# Patient Record
Sex: MALE | Race: WHITE | NOT HISPANIC OR LATINO
[De-identification: names, ages, dates, MRNs, and addresses within clinical notes are randomized per-mention and may not be internally consistent; named-entity substitution may affect disease eponyms.]

---

## 2017-04-07 ENCOUNTER — TRANSCRIPTION ENCOUNTER (OUTPATIENT)
Age: 47
End: 2017-04-07

## 2017-05-13 ENCOUNTER — TRANSCRIPTION ENCOUNTER (OUTPATIENT)
Age: 47
End: 2017-05-13

## 2017-06-01 ENCOUNTER — TRANSCRIPTION ENCOUNTER (OUTPATIENT)
Age: 47
End: 2017-06-01

## 2017-06-06 ENCOUNTER — TRANSCRIPTION ENCOUNTER (OUTPATIENT)
Age: 47
End: 2017-06-06

## 2018-01-06 ENCOUNTER — TRANSCRIPTION ENCOUNTER (OUTPATIENT)
Age: 48
End: 2018-01-06

## 2018-11-14 ENCOUNTER — TRANSCRIPTION ENCOUNTER (OUTPATIENT)
Age: 48
End: 2018-11-14

## 2019-03-06 ENCOUNTER — TRANSCRIPTION ENCOUNTER (OUTPATIENT)
Age: 49
End: 2019-03-06

## 2020-06-11 ENCOUNTER — INPATIENT (INPATIENT)
Facility: HOSPITAL | Age: 50
LOS: 0 days | Discharge: HOME | DRG: 343 | End: 2020-06-12
Attending: SURGERY | Admitting: SURGERY
Payer: COMMERCIAL

## 2020-06-11 ENCOUNTER — TRANSCRIPTION ENCOUNTER (OUTPATIENT)
Age: 50
End: 2020-06-11

## 2020-06-11 ENCOUNTER — RESULT REVIEW (OUTPATIENT)
Age: 50
End: 2020-06-11

## 2020-06-11 VITALS
DIASTOLIC BLOOD PRESSURE: 100 MMHG | OXYGEN SATURATION: 99 % | SYSTOLIC BLOOD PRESSURE: 149 MMHG | HEART RATE: 103 BPM | TEMPERATURE: 100 F | RESPIRATION RATE: 20 BRPM

## 2020-06-11 DIAGNOSIS — K37 UNSPECIFIED APPENDICITIS: ICD-10-CM

## 2020-06-11 DIAGNOSIS — Z98.890 OTHER SPECIFIED POSTPROCEDURAL STATES: Chronic | ICD-10-CM

## 2020-06-11 LAB
ALBUMIN SERPL ELPH-MCNC: 4 G/DL — SIGNIFICANT CHANGE UP (ref 3.3–5)
ALP SERPL-CCNC: 52 U/L — SIGNIFICANT CHANGE UP (ref 40–120)
ALT FLD-CCNC: 39 U/L — SIGNIFICANT CHANGE UP (ref 12–78)
ANION GAP SERPL CALC-SCNC: 8 MMOL/L — SIGNIFICANT CHANGE UP (ref 5–17)
APTT BLD: 25.3 SEC — LOW (ref 27.5–36.3)
AST SERPL-CCNC: 20 U/L — SIGNIFICANT CHANGE UP (ref 15–37)
BASOPHILS # BLD AUTO: 0.03 K/UL — SIGNIFICANT CHANGE UP (ref 0–0.2)
BASOPHILS NFR BLD AUTO: 0.2 % — SIGNIFICANT CHANGE UP (ref 0–2)
BILIRUB SERPL-MCNC: 1.4 MG/DL — HIGH (ref 0.2–1.2)
BUN SERPL-MCNC: 14 MG/DL — SIGNIFICANT CHANGE UP (ref 7–23)
CALCIUM SERPL-MCNC: 9.4 MG/DL — SIGNIFICANT CHANGE UP (ref 8.5–10.1)
CHLORIDE SERPL-SCNC: 102 MMOL/L — SIGNIFICANT CHANGE UP (ref 96–108)
CO2 SERPL-SCNC: 26 MMOL/L — SIGNIFICANT CHANGE UP (ref 22–31)
CREAT SERPL-MCNC: 0.97 MG/DL — SIGNIFICANT CHANGE UP (ref 0.5–1.3)
EOSINOPHIL # BLD AUTO: 0.08 K/UL — SIGNIFICANT CHANGE UP (ref 0–0.5)
EOSINOPHIL NFR BLD AUTO: 0.5 % — SIGNIFICANT CHANGE UP (ref 0–6)
GLUCOSE SERPL-MCNC: 149 MG/DL — HIGH (ref 70–99)
HCT VFR BLD CALC: 45.4 % — SIGNIFICANT CHANGE UP (ref 39–50)
HGB BLD-MCNC: 14.6 G/DL — SIGNIFICANT CHANGE UP (ref 13–17)
IMM GRANULOCYTES NFR BLD AUTO: 0.4 % — SIGNIFICANT CHANGE UP (ref 0–1.5)
INR BLD: 1.2 RATIO — HIGH (ref 0.88–1.16)
LACTATE SERPL-SCNC: 1.5 MMOL/L — SIGNIFICANT CHANGE UP (ref 0.7–2)
LIDOCAIN IGE QN: 74 U/L — SIGNIFICANT CHANGE UP (ref 73–393)
LYMPHOCYTES # BLD AUTO: 15.9 % — SIGNIFICANT CHANGE UP (ref 13–44)
LYMPHOCYTES # BLD AUTO: 2.37 K/UL — SIGNIFICANT CHANGE UP (ref 1–3.3)
MCHC RBC-ENTMCNC: 25.3 PG — LOW (ref 27–34)
MCHC RBC-ENTMCNC: 32.2 GM/DL — SIGNIFICANT CHANGE UP (ref 32–36)
MCV RBC AUTO: 78.7 FL — LOW (ref 80–100)
MONOCYTES # BLD AUTO: 1.27 K/UL — HIGH (ref 0–0.9)
MONOCYTES NFR BLD AUTO: 8.5 % — SIGNIFICANT CHANGE UP (ref 2–14)
NEUTROPHILS # BLD AUTO: 11.12 K/UL — HIGH (ref 1.8–7.4)
NEUTROPHILS NFR BLD AUTO: 74.5 % — SIGNIFICANT CHANGE UP (ref 43–77)
PLATELET # BLD AUTO: 248 K/UL — SIGNIFICANT CHANGE UP (ref 150–400)
POTASSIUM SERPL-MCNC: 3.5 MMOL/L — SIGNIFICANT CHANGE UP (ref 3.5–5.3)
POTASSIUM SERPL-SCNC: 3.5 MMOL/L — SIGNIFICANT CHANGE UP (ref 3.5–5.3)
PROT SERPL-MCNC: 8 GM/DL — SIGNIFICANT CHANGE UP (ref 6–8.3)
PROTHROM AB SERPL-ACNC: 13.4 SEC — HIGH (ref 10–12.9)
RBC # BLD: 5.77 M/UL — SIGNIFICANT CHANGE UP (ref 4.2–5.8)
RBC # FLD: 13.6 % — SIGNIFICANT CHANGE UP (ref 10.3–14.5)
SARS-COV-2 RNA SPEC QL NAA+PROBE: SIGNIFICANT CHANGE UP
SODIUM SERPL-SCNC: 136 MMOL/L — SIGNIFICANT CHANGE UP (ref 135–145)
WBC # BLD: 14.93 K/UL — HIGH (ref 3.8–10.5)
WBC # FLD AUTO: 14.93 K/UL — HIGH (ref 3.8–10.5)

## 2020-06-11 PROCEDURE — 44970 LAPAROSCOPY APPENDECTOMY: CPT

## 2020-06-11 PROCEDURE — 74177 CT ABD & PELVIS W/CONTRAST: CPT | Mod: 26

## 2020-06-11 PROCEDURE — C1889: CPT

## 2020-06-11 PROCEDURE — 99223 1ST HOSP IP/OBS HIGH 75: CPT | Mod: 57

## 2020-06-11 PROCEDURE — 88304 TISSUE EXAM BY PATHOLOGIST: CPT

## 2020-06-11 RX ORDER — ONDANSETRON 8 MG/1
4 TABLET, FILM COATED ORAL ONCE
Refills: 0 | Status: COMPLETED | OUTPATIENT
Start: 2020-06-11 | End: 2020-06-11

## 2020-06-11 RX ORDER — SODIUM CHLORIDE 9 MG/ML
1000 INJECTION INTRAMUSCULAR; INTRAVENOUS; SUBCUTANEOUS ONCE
Refills: 0 | Status: COMPLETED | OUTPATIENT
Start: 2020-06-11 | End: 2020-06-11

## 2020-06-11 RX ORDER — MORPHINE SULFATE 50 MG/1
2 CAPSULE, EXTENDED RELEASE ORAL EVERY 4 HOURS
Refills: 0 | Status: DISCONTINUED | OUTPATIENT
Start: 2020-06-11 | End: 2020-06-12

## 2020-06-11 RX ORDER — ONDANSETRON 8 MG/1
4 TABLET, FILM COATED ORAL EVERY 8 HOURS
Refills: 0 | Status: DISCONTINUED | OUTPATIENT
Start: 2020-06-11 | End: 2020-06-11

## 2020-06-11 RX ORDER — MORPHINE SULFATE 50 MG/1
4 CAPSULE, EXTENDED RELEASE ORAL ONCE
Refills: 0 | Status: DISCONTINUED | OUTPATIENT
Start: 2020-06-11 | End: 2020-06-11

## 2020-06-11 RX ORDER — CIPROFLOXACIN LACTATE 400MG/40ML
400 VIAL (ML) INTRAVENOUS ONCE
Refills: 0 | Status: COMPLETED | OUTPATIENT
Start: 2020-06-11 | End: 2020-06-11

## 2020-06-11 RX ORDER — KETOROLAC TROMETHAMINE 30 MG/ML
15 SYRINGE (ML) INJECTION EVERY 6 HOURS
Refills: 0 | Status: COMPLETED | OUTPATIENT
Start: 2020-06-11 | End: 2020-06-12

## 2020-06-11 RX ORDER — ACETAMINOPHEN 500 MG
1000 TABLET ORAL EVERY 6 HOURS
Refills: 0 | Status: DISCONTINUED | OUTPATIENT
Start: 2020-06-11 | End: 2020-06-12

## 2020-06-11 RX ORDER — SODIUM CHLORIDE 9 MG/ML
1000 INJECTION, SOLUTION INTRAVENOUS ONCE
Refills: 0 | Status: COMPLETED | OUTPATIENT
Start: 2020-06-11 | End: 2020-06-11

## 2020-06-11 RX ORDER — METRONIDAZOLE 500 MG
500 TABLET ORAL EVERY 8 HOURS
Refills: 0 | Status: DISCONTINUED | OUTPATIENT
Start: 2020-06-11 | End: 2020-06-11

## 2020-06-11 RX ORDER — SODIUM CHLORIDE 9 MG/ML
1000 INJECTION, SOLUTION INTRAVENOUS
Refills: 0 | Status: DISCONTINUED | OUTPATIENT
Start: 2020-06-11 | End: 2020-06-12

## 2020-06-11 RX ORDER — HEPARIN SODIUM 5000 [USP'U]/ML
5000 INJECTION INTRAVENOUS; SUBCUTANEOUS EVERY 8 HOURS
Refills: 0 | Status: DISCONTINUED | OUTPATIENT
Start: 2020-06-11 | End: 2020-06-11

## 2020-06-11 RX ORDER — METRONIDAZOLE 500 MG
500 TABLET ORAL ONCE
Refills: 0 | Status: COMPLETED | OUTPATIENT
Start: 2020-06-11 | End: 2020-06-11

## 2020-06-11 RX ORDER — LORATADINE 10 MG/1
1 TABLET ORAL
Qty: 0 | Refills: 0 | DISCHARGE

## 2020-06-11 RX ORDER — CIPROFLOXACIN LACTATE 400MG/40ML
200 VIAL (ML) INTRAVENOUS EVERY 12 HOURS
Refills: 0 | Status: CANCELLED | OUTPATIENT
Start: 2020-06-12 | End: 2020-06-11

## 2020-06-11 RX ORDER — PANTOPRAZOLE SODIUM 20 MG/1
40 TABLET, DELAYED RELEASE ORAL DAILY
Refills: 0 | Status: DISCONTINUED | OUTPATIENT
Start: 2020-06-11 | End: 2020-06-11

## 2020-06-11 RX ADMIN — Medication 200 MILLIGRAM(S): at 22:40

## 2020-06-11 RX ADMIN — SODIUM CHLORIDE 1000 MILLILITER(S): 9 INJECTION INTRAMUSCULAR; INTRAVENOUS; SUBCUTANEOUS at 20:28

## 2020-06-11 RX ADMIN — ONDANSETRON 4 MILLIGRAM(S): 8 TABLET, FILM COATED ORAL at 20:28

## 2020-06-11 RX ADMIN — Medication 100 MILLIGRAM(S): at 21:47

## 2020-06-11 RX ADMIN — MORPHINE SULFATE 4 MILLIGRAM(S): 50 CAPSULE, EXTENDED RELEASE ORAL at 20:30

## 2020-06-11 NOTE — ED ADULT NURSE NOTE - OBJECTIVE STATEMENT
Pt presents to the ED for evaluation of RLQ abdominal pain which began last evening. Pt states he's been having nausea, some diarrhea and severe pains. Denies previous medical hx or abdominal surgeries. States he only ate a little bit of custard in the past 24h and is unable to keep any other type of PO intake down. Denies fevers/sick contacts.

## 2020-06-11 NOTE — H&P ADULT - NSHPPHYSICALEXAM_GEN_ALL_CORE
General Appearance: Appears well, NAD  Neck: Supple  Chest: Equal expansion bilaterally, equal breath sounds  CV: Pulse regular presently  Abdomen: Soft, non-distended, tender to palpation in the umbilical area and RLQ, some guarding  Extremities: Grossly symmetric General Appearance: Appears well, NAD  Neck: Supple  Chest: Equal expansion bilaterally, equal breath sounds  CV: Pulse regular presently  Abdomen: Soft, non-distended, tender to palpation in the umbilical area and RLQ, some guarding  Extremities: Grossly symmetric    Attending exam:  Pt is AAOx3  Pt in no acute distress  Neuro: CNII-XII grossly intact   Psych: normal affect  HEENT: Normocephalic, atraumatic, BAILEY, EOM wnl  Neck: No crepitus, no ecchymosis, no hematoma, to exam, no JVD, no tracheal deviation  Cardiovascular: S1S2 Present  Respiratory: Respiratory Effort normal; no wheezes, rales or rhonchi to exam, CTAB  ABD: bowel sounds (+), soft, + right lower quadrant tenderness to exam, non distended, no rebound, no guarding, no rigidity, no skin changes to exam. No pelvic instability to exam, no skin changes, negative cavazos's sign to exam  Musculoskeletal: All digits are warm and well perfused. Pt demonstrates grossly intact sensoromotor function. Pt has good capillary refill to digits, no calf edema or tenderness to exam.  Skin: no jaundice or icteric sclera to exam b/l, no skin changes to exam    Vital Signs Last 24 Hrs  T(C): 37.6 (11 Jun 2020 23:21), Max: 37.8 (11 Jun 2020 19:56)  T(F): 99.7 (11 Jun 2020 23:21), Max: 100 (11 Jun 2020 19:56)  HR: 104 (11 Jun 2020 23:21) (103 - 104)  BP: 140/82 (11 Jun 2020 23:21) (140/82 - 149/100)  BP(mean): 123 (11 Jun 2020 19:56) (123 - 123)  RR: 19 (11 Jun 2020 23:21) (19 - 20)  SpO2: 98% (11 Jun 2020 23:21) (98% - 99%)

## 2020-06-11 NOTE — DISCHARGE NOTE PROVIDER - HOSPITAL COURSE
Pt s/p laparoscopic appendectomy for acute appendicitis        Pt to have outpatient follow up with Dr Alegria of surgical oncology for adrenal mass/lesion incidentally noted on CT scan

## 2020-06-11 NOTE — ED ADULT NURSE REASSESSMENT NOTE - NS ED NURSE REASSESS COMMENT FT1
Patient report given to Re on [facility].  Patient VS stable.  OR called OR time changed.  Patient held in ED for transport to OR.  IV site clean, dry and intact, no redness or swelling noted at site.  Cipro infusing via pump as per protocol.  Patient to be undressed and belongings locked up at this time.  Allergy band on patient and allergies verified.

## 2020-06-11 NOTE — ED STATDOCS - PROGRESS NOTE DETAILS
48 y/o M presents with abd pain since last night. Pt reports RLQ pain moderate to severe in intensity associated with n/v/d. Endorses fever/chills at home. No other complaints at this time. -Kishor Knapp PA-C Blood cultures not indicated at this time  Spoke with Surgery who will evaluate pt. -Kishor Knapp PA-C

## 2020-06-11 NOTE — H&P ADULT - HISTORY OF PRESENT ILLNESS
Pt is 44 yo M that presented to the ED with a complaint of abdominal pain that began last night. Pt stated that the pain prevented him from sleeping and had nausea and vomiting . Pt woke up this morning with decreased appetite but felt that the pain was alittle better. Then today the he began to feel chills and severe pain and that is what brought him in. Pt denied and medical history. Pt denied SOB, fevers, recent travel or sick contact    PmHx: Denied  PsHx: B/L inguinal hernia repair (at age 6)  All: PCN Pt is 50 yo M that presented to the ED with a complaint of abdominal pain that began last night. Pt stated that the pain prevented him from sleeping and had nausea and vomiting . Pt woke up this morning with decreased appetite but felt that the pain was alittle better. Then today the he began to feel chills and severe pain and that is what brought him in. Pt denied and medical history. Pt denied SOB, fevers, recent travel or sick contact    PmHx: Denied  PsHx: B/L inguinal hernia repair (at age 6)  All: PCN Pt is 48 yo M that presented to the ED with a complaint of abdominal pain that began last night 6/10/20. Pt stated that the pain prevented him from sleeping and had nausea and vomiting . Pt woke up this morning with decreased appetite but felt that the pain was alittle better. Then today the he began to feel chills and severe pain and that is what brought him in. Pt denied and medical history. Pt denied SOB, fevers, recent travel or sick contact    PmHx: Denied  PsHx: B/L inguinal hernia repair (at age 6)  All: PCN  SH: social etoh, denied tobacco or illicit drug use

## 2020-06-11 NOTE — ED ADULT NURSE NOTE - CHIEF COMPLAINT QUOTE
worsening sharp lower right abdominal pain with nausea and vomiting and diarrhea since last night. took tylenol 100mg at 6:10 PM no relief.    wife: reji guillen [phone], cell [phone]

## 2020-06-11 NOTE — DISCHARGE NOTE PROVIDER - CARE PROVIDER_API CALL
Prabhjot Alegria (MD)  Surgery  [address], [address] [place] [address]  Phone: [phone]  Fax: [phone]  Follow Up Time: 2 weeks    Adolfo Brunson  SURGERY  [address] [place] [address]  Phone: [phone]  Fax: [phone]  Follow Up Time: 2 weeks

## 2020-06-11 NOTE — DISCHARGE NOTE PROVIDER - NSDCMRMEDTOKEN_GEN_ALL_CORE_FT
Claritin 5 mg oral tablet, chewable: 1 tab(s) orally once a day Claritin 5 mg oral tablet, chewable: 1 tab(s) orally once a day  oxyCODONE 5 mg oral tablet: 1 tab(s) orally every 6 hours, As Needed -for moderate pain MDD:4 tabs

## 2020-06-11 NOTE — ED STATDOCS - GENITOURINARY [+], MLM
Pharmacy requesting refill: Naratriptan  Last OV: 3/30/18  Last refill: 12/6/17  Refilled.     DYSURIA

## 2020-06-11 NOTE — DISCHARGE NOTE PROVIDER - NSDCFUADDAPPT_GEN_ALL_CORE_FT
Please follow up with Dr Alegria of surgical oncology for evaluation of adrenal lesion incidentaly noted on CT scan

## 2020-06-11 NOTE — ED ADULT TRIAGE NOTE - CHIEF COMPLAINT QUOTE
worsening sharp lower right abdominal pain with nausea and vomiting and diarrhea since last night. took tylenol 100mg at 6:10 PM no relief.    wife: reji guillen [phone], cell [phone]

## 2020-06-11 NOTE — DISCHARGE NOTE PROVIDER - NSDCACTIVITY_GEN_ALL_CORE
Showering allowed/Do not make important decisions/Stairs allowed/Do not drive or operate machinery/Walking - Outdoors allowed/Walking - Indoors allowed/No heavy lifting/straining

## 2020-06-11 NOTE — H&P ADULT - ATTENDING COMMENTS
A/P:  Acute appendicitis  IV antibiotics  NPO, IV hydration  GI/DVT prophylaxis  Pain control  Incentive spirometry  Outpt non emergent evaluation of adrenal lesion incidentaly noted on CT  Pt for surgical intervention of appendectomy  Pt aware of and agrees with all of the above    Pt seen and examined at bedside with chaperone. Pt is AAOx3, pt in no acute distress. Pt has acute appendicitis. Pt explained the surgical procedures in both medical terminology and in lay terms that the patient understood, laparascopic possible open appendectomy, possible exploratory laparotomy. Pt explained in both medical terminology and in lay terms that the patient understood, the benefits and alternatives of surgery including non-operative management. Pt explained at length in both medical terminology and in lay terms that the patient understood, the associated risks of surgery including but not limited to infection, bleeding, nerve/organ injury, postoperative pain, poor wound healing, risk of anastamotic leak or breakdown, scar formation both internally and externally, risk of seroma/hematoma/abcess formation, risk of hernia development, risk of need for further GI/IR/Surgery intervention as required. Pt understood all of the above. All questions were answered. Pt gave informed consent for surgery.

## 2020-06-11 NOTE — DISCHARGE NOTE PROVIDER - NSDCFUADDINST_GEN_ALL_CORE_FT
Please seek immediate medical attention for fever>100.4, worsening abdominal pain, chest pain, shortness of breath, any adverse changes to health

## 2020-06-11 NOTE — ED STATDOCS - OBJECTIVE STATEMENT
48 y/o male with no PMHx presents to the ED c/o severe +RLQ pain beginning last night. Endorses associated +N/V/D,  +dysuria, +fever, and +chills. No hx of abd surgery. Allergic to penicillins.

## 2020-06-11 NOTE — H&P ADULT - ASSESSMENT
Plan:  Admit to Surgery: Dr Brunson  Pain control PRN  Nausea control PRN  Antibiotics( Cipro |Flagyl)  NPO after midnight  IV Hydration  IV bolus  Incentive Spirometry  COVID Swab  Encourage OOB to ambulation  GI and DVT ppx  Venodynes  Plan for possible Laparoscopic Appendectomy in8 hrs due to pt eat around 7pm. Risk and benefits of procedure explained    Case discussed with Dr Brunson

## 2020-06-11 NOTE — H&P ADULT - NSHPLABSRESULTS_GEN_ALL_CORE
CT Abdomen and Pelvis w/ IV Cont (06.11.20 @ 20:40)       FINDINGS:  LOWER CHEST: Within normal limits.    LIVER: 1.3 cm hypervascular focus in the lateral left hepatic lobe (2; 23). Subcentimeter hypodense lesion in the posterior right hepatic lobe.  BILE DUCTS: Normal caliber.  GALLBLADDER: Within normal limits.  SPLEEN: Within normal limits.  PANCREAS: Within normal limits.  ADRENALS: 2.7 x 2.2 cm indeterminate left adrenal nodule.  KIDNEYS/URETERS: No hydronephrosis, stone or mass. Left renal cyst. Subcentimeter hypodense foci in the right kidney which are too small to characterize.    BLADDER: Within normal limits.  REPRODUCTIVE ORGANS: Prostate within normal limits.    BOWEL: No bowel obstruction. Appendix is dilated to 15 mm with avid mural enhancement and extensive periappendiceal inflammation. Small amount of periappendiceal fluid but no abscess or extraluminal gas. No appendicoliths visualized. Small hiatal hernia. Moderate colonic diverticulosis without diverticulitis.  PERITONEUM: No ascites or pneumoperitoneum.  VESSELS: Mild atherosclerotic arterial calcification.  RETROPERITONEUM/LYMPH NODES: No lymphadenopathy.    ABDOMINAL WALL: Within normal limits.  BONES: No acute bony abnormality.    IMPRESSION:   Acute appendicitis without evidence of perforation.    Indeterminate 2.7 cm left adrenal nodule and 1.3 cm liver lesion. Recommend nonemergent MRI to further evaluate. 14.6   14.93 )-----------( 248      ( 11 Jun 2020 20:19 )             45.4     CBC Full  -  ( 11 Jun 2020 20:19 )  WBC Count : 14.93 K/uL  RBC Count : 5.77 M/uL  Hemoglobin : 14.6 g/dL  Hematocrit : 45.4 %  Platelet Count - Automated : 248 K/uL  Mean Cell Volume : 78.7 fl  Mean Cell Hemoglobin : 25.3 pg  Mean Cell Hemoglobin Concentration : 32.2 gm/dL  Auto Neutrophil # : 11.12 K/uL  Auto Lymphocyte # : 2.37 K/uL  Auto Monocyte # : 1.27 K/uL  Auto Eosinophil # : 0.08 K/uL  Auto Basophil # : 0.03 K/uL  Auto Neutrophil % : 74.5 %  Auto Lymphocyte % : 15.9 %  Auto Monocyte % : 8.5 %  Auto Eosinophil % : 0.5 %  Auto Basophil % : 0.2 %    06-11    136  |  102  |  14  ----------------------------<  149<H>  3.5   |  26  |  0.97    Ca    9.4      11 Jun 2020 20:19    TPro  8.0  /  Alb  4.0  /  TBili  1.4<H>  /  DBili  x   /  AST  20  /  ALT  39  /  AlkPhos  52  06-11    LIVER FUNCTIONS - ( 11 Jun 2020 20:19 )  Alb: 4.0 g/dL / Pro: 8.0 gm/dL / ALK PHOS: 52 U/L / ALT: 39 U/L / AST: 20 U/L / GGT: x           PT/INR - ( 11 Jun 2020 20:19 )   PT: 13.4 sec;   INR: 1.20 ratio         PTT - ( 11 Jun 2020 20:19 )  PTT:25.3 sec    Radiology:    EXAM:  CT ABDOMEN AND PELVIS IC                          PROCEDURE DATE:  06/11/2020      INTERPRETATION:  CLINICAL INFORMATION: Right lower quadrant abdominal pain    COMPARISON: None.    IMPRESSION:   Acute appendicitis without evidence of perforation.    Indeterminate 2.7 cm left adrenal nodule and 1.3 cm liver lesion. Recommend nonemergent MRI to further evaluate.

## 2020-06-11 NOTE — DISCHARGE NOTE PROVIDER - PROVIDER TOKENS
PROVIDER:[TOKEN:[[id number]],FOLLOWUP:[2 weeks]],PROVIDER:[TOKEN:[[id number]],FOLLOWUP:[2 weeks]]

## 2020-06-12 ENCOUNTER — TRANSCRIPTION ENCOUNTER (OUTPATIENT)
Age: 50
End: 2020-06-12

## 2020-06-12 VITALS
SYSTOLIC BLOOD PRESSURE: 115 MMHG | DIASTOLIC BLOOD PRESSURE: 60 MMHG | OXYGEN SATURATION: 96 % | TEMPERATURE: 98 F | HEART RATE: 89 BPM | RESPIRATION RATE: 19 BRPM

## 2020-06-12 LAB — BLD GP AB SCN SERPL QL: SIGNIFICANT CHANGE UP

## 2020-06-12 PROCEDURE — 88304 TISSUE EXAM BY PATHOLOGIST: CPT | Mod: 26

## 2020-06-12 RX ORDER — ONDANSETRON 8 MG/1
4 TABLET, FILM COATED ORAL EVERY 8 HOURS
Refills: 0 | Status: DISCONTINUED | OUTPATIENT
Start: 2020-06-12 | End: 2020-06-12

## 2020-06-12 RX ORDER — OXYCODONE HYDROCHLORIDE 5 MG/1
5 TABLET ORAL ONCE
Refills: 0 | Status: DISCONTINUED | OUTPATIENT
Start: 2020-06-12 | End: 2020-06-12

## 2020-06-12 RX ORDER — SODIUM CHLORIDE 9 MG/ML
1000 INJECTION, SOLUTION INTRAVENOUS
Refills: 0 | Status: DISCONTINUED | OUTPATIENT
Start: 2020-06-12 | End: 2020-06-12

## 2020-06-12 RX ORDER — OXYCODONE HYDROCHLORIDE 5 MG/1
1 TABLET ORAL
Qty: 20 | Refills: 0
Start: 2020-06-12 | End: 2020-06-16

## 2020-06-12 RX ORDER — ACETAMINOPHEN 500 MG
1000 TABLET ORAL ONCE
Refills: 0 | Status: COMPLETED | OUTPATIENT
Start: 2020-06-12 | End: 2020-06-12

## 2020-06-12 RX ORDER — FENTANYL CITRATE 50 UG/ML
50 INJECTION INTRAVENOUS
Refills: 0 | Status: DISCONTINUED | OUTPATIENT
Start: 2020-06-12 | End: 2020-06-12

## 2020-06-12 RX ORDER — OXYCODONE AND ACETAMINOPHEN 5; 325 MG/1; MG/1
2 TABLET ORAL EVERY 4 HOURS
Refills: 0 | Status: DISCONTINUED | OUTPATIENT
Start: 2020-06-12 | End: 2020-06-12

## 2020-06-12 RX ADMIN — Medication 15 MILLIGRAM(S): at 01:37

## 2020-06-12 RX ADMIN — SODIUM CHLORIDE 110 MILLILITER(S): 9 INJECTION, SOLUTION INTRAVENOUS at 01:35

## 2020-06-12 RX ADMIN — Medication 400 MILLIGRAM(S): at 06:56

## 2020-06-12 RX ADMIN — Medication 400 MILLIGRAM(S): at 01:35

## 2020-06-12 RX ADMIN — Medication 15 MILLIGRAM(S): at 06:56

## 2020-06-12 NOTE — DISCHARGE NOTE NURSING/CASE MANAGEMENT/SOCIAL WORK - PATIENT PORTAL LINK FT
You can access the FollowMyHealth Patient Portal offered by [facility] by registering at the following website: [email or web address]. By joining [employer]’s FollowMyHealth portal, you will also be able to view your health information using other applications (apps) compatible with our system.

## 2020-06-12 NOTE — PROGRESS NOTE ADULT - SUBJECTIVE AND OBJECTIVE BOX
Pt was seen and examined this morning at bedside. Pt stated that he is feeling much better in comparison to when he first came to the hospital. Pt to have diet this morning. Denied fever, chills, nausea and vomiting since surgery.    Vital Signs Last 24 Hrs  T(C): 36.6 (12 Jun 2020 05:13), Max: 37.8 (11 Jun 2020 19:56)  T(F): 97.9 (12 Jun 2020 05:13), Max: 100 (11 Jun 2020 19:56)  HR: 75 (12 Jun 2020 05:13) (75 - 115)  BP: 115/71 (12 Jun 2020 05:13) (115/71 - 158/76)  BP(mean): 123 (11 Jun 2020 19:56) (123 - 123)  RR: 20 (12 Jun 2020 05:13) (11 - 23)  SpO2: 99% (12 Jun 2020 05:13) (81% - 100%)    General Appearance: Appears well, NAD  Neck: Supple  Chest: Equal expansion bilaterally, equal breath sounds  CV: Pulse regular presently  Abdomen: Soft, non-distended, incisional tenderness, dressings intact  Extremities: Grossly symmetric    Labs:   No new labs 6/12 6/11/20:                        14.6                 136  | 26   | 14           14.93<H> >-----------< 248     ------------------------< 149<H>                           45.4                 3.5  | 102  | 0.97                                                                      Ca 9.4   Mg x     Ph x

## 2020-06-12 NOTE — PROGRESS NOTE ADULT - ASSESSMENT
50 YO M with acute appendicitis POD0 laparoscopic Appendectomy    Pain control  Encourage ambulation and incentive spirometry use  encourage a diet  If tolerating diet pt can be discharged home today    Case discussed with Dr Ledesma

## 2020-06-12 NOTE — PROGRESS NOTE ADULT - ASSESSMENT
Admitted for appendicitis, s/p lap appendectomy. POD1    Plan:    Pain control PRN  Nausea control PRN  Antibiotics( Cipro |Flagyl)  Regular diet  Incentive Spirometry  Encourage OOB to ambulation  GI and DVT ppx  Plan for discharge this afternoon after diet tolerated    Case discussed with surgical attending,  Dr Ledesma

## 2020-06-12 NOTE — PROGRESS NOTE ADULT - SUBJECTIVE AND OBJECTIVE BOX
Seen and examined at bedside this am. Doing well.   s/p lap appendectomy. POD#1   Pain controlled. Has not eaten yet. No acute event overnight.    Vital Signs Last 24 Hrs  T(C): 36.6 (12 Jun 2020 05:13), Max: 37.8 (11 Jun 2020 19:56)  T(F): 97.9 (12 Jun 2020 05:13), Max: 100 (11 Jun 2020 19:56)  HR: 75 (12 Jun 2020 05:13) (75 - 115)  BP: 115/71 (12 Jun 2020 05:13) (115/71 - 158/76)  BP(mean): 123 (11 Jun 2020 19:56) (123 - 123)  RR: 20 (12 Jun 2020 05:13) (11 - 23)  SpO2: 99% (12 Jun 2020 05:13) (81% - 100%)    NAD, afebrile  A&Ox3  RRR, S1/S2  CTAB  soft abd, ND, mild tenderness, small dressings in place

## 2020-06-12 NOTE — PATIENT PROFILE ADULT - DISASTER - FUNCTIONAL SCREEN CURRENT LEVEL: COMMUNICATION, MLM
Problem: Patient Care Overview  Goal: Plan of Care Review  Outcome: Ongoing (interventions implemented as appropriate)  Infant in isolette VSS. See data flowsheet.       0 = understands/communicates without difficulty

## 2020-06-15 DIAGNOSIS — Z88.0 ALLERGY STATUS TO PENICILLIN: ICD-10-CM

## 2020-06-15 DIAGNOSIS — K35.80 UNSPECIFIED ACUTE APPENDICITIS: ICD-10-CM

## 2020-06-15 DIAGNOSIS — E27.8 OTHER SPECIFIED DISORDERS OF ADRENAL GLAND: ICD-10-CM

## 2020-06-15 DIAGNOSIS — Z11.59 ENCOUNTER FOR SCREENING FOR OTHER VIRAL DISEASES: ICD-10-CM

## 2020-06-16 PROBLEM — Z78.9 OTHER SPECIFIED HEALTH STATUS: Chronic | Status: ACTIVE | Noted: 2020-06-11

## 2020-06-23 PROBLEM — Z00.00 ENCOUNTER FOR PREVENTIVE HEALTH EXAMINATION: Status: ACTIVE | Noted: 2020-06-23

## 2020-06-24 ENCOUNTER — APPOINTMENT (OUTPATIENT)
Age: 50
End: 2020-06-24
Payer: COMMERCIAL

## 2020-06-24 VITALS
DIASTOLIC BLOOD PRESSURE: 77 MMHG | HEART RATE: 55 BPM | TEMPERATURE: 98.4 F | SYSTOLIC BLOOD PRESSURE: 125 MMHG | BODY MASS INDEX: 30.78 KG/M2 | WEIGHT: 187 LBS | OXYGEN SATURATION: 96 % | HEIGHT: 65.5 IN

## 2020-06-24 DIAGNOSIS — Z87.898 PERSONAL HISTORY OF OTHER SPECIFIED CONDITIONS: ICD-10-CM

## 2020-06-24 PROCEDURE — 99024 POSTOP FOLLOW-UP VISIT: CPT

## 2020-06-24 NOTE — REASON FOR VISIT
[FreeTextEntry1] : s/p appendectomy, no complaints. [Follow-Up] : a follow-up visit [Spouse] : spouse

## 2020-06-24 NOTE — PHYSICAL EXAM
[Abdomen Tenderness] : ~T ~M No abdominal tenderness [de-identified] : soft, non tender, umbilical port site with draining serous fluid, no signs of infection or cellulitis.\par Other port sites healed.

## 2020-07-20 ENCOUNTER — APPOINTMENT (OUTPATIENT)
Dept: SURGICAL ONCOLOGY | Facility: CLINIC | Age: 50
End: 2020-07-20
Payer: COMMERCIAL

## 2020-07-20 VITALS
HEIGHT: 65.5 IN | WEIGHT: 187 LBS | OXYGEN SATURATION: 97 % | BODY MASS INDEX: 30.78 KG/M2 | TEMPERATURE: 98.3 F | HEART RATE: 75 BPM | SYSTOLIC BLOOD PRESSURE: 126 MMHG | DIASTOLIC BLOOD PRESSURE: 82 MMHG

## 2020-07-20 DIAGNOSIS — Z80.0 FAMILY HISTORY OF MALIGNANT NEOPLASM OF DIGESTIVE ORGANS: ICD-10-CM

## 2020-07-20 DIAGNOSIS — Z78.9 OTHER SPECIFIED HEALTH STATUS: ICD-10-CM

## 2020-07-20 DIAGNOSIS — D35.00 BENIGN NEOPLASM OF UNSPECIFIED ADRENAL GLAND: ICD-10-CM

## 2020-07-20 DIAGNOSIS — D35.02 BENIGN NEOPLASM OF LEFT ADRENAL GLAND: ICD-10-CM

## 2020-07-20 PROCEDURE — 99203 OFFICE O/P NEW LOW 30 MIN: CPT

## 2020-07-20 NOTE — PHYSICAL EXAM
[FreeTextEntry1] : Health appearing male in no apparent distress. [Normal] : supple, no neck mass and thyroid not enlarged [Normal] : oriented to person, place and time, with appropriate affect [de-identified] : Healed laparoscopy scare

## 2020-07-20 NOTE — REASON FOR VISIT
[Initial Consultation] : an initial consultation for [FreeTextEntry2] : Left adrenal Nodule [Spouse] : spouse

## 2020-07-20 NOTE — ASSESSMENT
[FreeTextEntry1] : Mr Hines is an asymptomatic healthy male who presents with a 2.7 cm left adrenal nodule. The characteristics on CT are consistent with an adenoma. The lesion is homogeneous with a smooth capsule. The hounsfield units on the adrenal nodule were not measured on the CT but I will review with our radiologist. The right adrenal is present and appears normal. I suspect this is a nonfunctioning adrenal adenoma. We will work it up with plasma electrolytes, aldosterone and metanephrines. If all are normal I will see him back in one year for a followup MRI of the adrenals. If the plasma tests are abnormal we will work up further.

## 2020-07-20 NOTE — HISTORY OF PRESENT ILLNESS
[de-identified] : This is a healthy 50 y/o  male  who was recently seen in the [facility] ER for abdominal pain. He was diagnosed with acte appendicitis and underwent an uneventful appendectomy by Dr Ledesma. His workup included an abdominal CT that demonstrated a left adrenal nodule measuring 2.7 cm.  Since his surgery he has remained asymptomatic. He denies any history of hypertension, palpations, anxiety, sweats, or changes in his weight. He denies any changes in bowel or bladder habits. His family history is significant for colon cancer in his maternal grand father. He denies trauma but he does practice martial arts and has taken blows to his trunk. He comes in today with his wife to enquire about next steps.

## 2020-10-28 NOTE — DISCHARGE NOTE PROVIDER - NSDCDCMDCOMP_GEN_ALL_CORE
Otc Regimen: Sunscreen every am and UVP clothing Initiate Treatment: Hydrocortisone cream apply twice daily to face and hands x 2 weeks. Then stop x 2 weeks Render In Strict Bullet Format?: No Detail Level: Zone This document is complete and the patient is ready for discharge.

## 2020-12-30 LAB
ALBUMIN SERPL ELPH-MCNC: 4.7 G/DL
ALP BLD-CCNC: 53 U/L
ALT SERPL-CCNC: 45 U/L
ANION GAP SERPL CALC-SCNC: 11 MMOL/L
AST SERPL-CCNC: 26 U/L
BILIRUB SERPL-MCNC: 1 MG/DL
BUN SERPL-MCNC: 15 MG/DL
CALCIUM SERPL-MCNC: 10.1 MG/DL
CHLORIDE SERPL-SCNC: 102 MMOL/L
CO2 SERPL-SCNC: 28 MMOL/L
CREAT SERPL-MCNC: 1.07 MG/DL
GLUCOSE SERPL-MCNC: 113 MG/DL
POTASSIUM SERPL-SCNC: 4.9 MMOL/L
PROT SERPL-MCNC: 7.3 G/DL
SODIUM SERPL-SCNC: 141 MMOL/L

## 2021-01-11 LAB
ALDOSTERONE SERUM: 10 NG/DL
METANEPHRINE, PL: 54 PG/ML
NORMETANEPHRINE, PL: 95.9 PG/ML

## 2021-03-10 ENCOUNTER — TRANSCRIPTION ENCOUNTER (OUTPATIENT)
Age: 51
End: 2021-03-10

## 2021-11-09 ENCOUNTER — EMERGENCY (EMERGENCY)
Facility: HOSPITAL | Age: 51
LOS: 0 days | Discharge: HOME | End: 2021-11-09
Attending: HOSPITALIST
Payer: COMMERCIAL

## 2021-11-09 ENCOUNTER — TRANSCRIPTION ENCOUNTER (OUTPATIENT)
Age: 51
End: 2021-11-09

## 2021-11-09 VITALS
HEART RATE: 77 BPM | OXYGEN SATURATION: 98 % | RESPIRATION RATE: 17 BRPM | DIASTOLIC BLOOD PRESSURE: 95 MMHG | TEMPERATURE: 98 F | SYSTOLIC BLOOD PRESSURE: 140 MMHG

## 2021-11-09 VITALS — WEIGHT: 192.9 LBS | HEIGHT: 65 IN

## 2021-11-09 DIAGNOSIS — R06.02 SHORTNESS OF BREATH: ICD-10-CM

## 2021-11-09 DIAGNOSIS — R06.00 DYSPNEA, UNSPECIFIED: ICD-10-CM

## 2021-11-09 DIAGNOSIS — Z88.0 ALLERGY STATUS TO PENICILLIN: ICD-10-CM

## 2021-11-09 DIAGNOSIS — Z98.890 OTHER SPECIFIED POSTPROCEDURAL STATES: Chronic | ICD-10-CM

## 2021-11-09 LAB
ALBUMIN SERPL ELPH-MCNC: 3.9 G/DL — SIGNIFICANT CHANGE UP (ref 3.3–5)
ALP SERPL-CCNC: 55 U/L — SIGNIFICANT CHANGE UP (ref 40–120)
ALT FLD-CCNC: 43 U/L — SIGNIFICANT CHANGE UP (ref 12–78)
ANION GAP SERPL CALC-SCNC: 5 MMOL/L — SIGNIFICANT CHANGE UP (ref 5–17)
AST SERPL-CCNC: 25 U/L — SIGNIFICANT CHANGE UP (ref 15–37)
BASOPHILS # BLD AUTO: 0.03 K/UL — SIGNIFICANT CHANGE UP (ref 0–0.2)
BASOPHILS NFR BLD AUTO: 0.4 % — SIGNIFICANT CHANGE UP (ref 0–2)
BILIRUB SERPL-MCNC: 0.6 MG/DL — SIGNIFICANT CHANGE UP (ref 0.2–1.2)
BUN SERPL-MCNC: 16 MG/DL — SIGNIFICANT CHANGE UP (ref 7–23)
CALCIUM SERPL-MCNC: 9.3 MG/DL — SIGNIFICANT CHANGE UP (ref 8.5–10.1)
CHLORIDE SERPL-SCNC: 106 MMOL/L — SIGNIFICANT CHANGE UP (ref 96–108)
CO2 SERPL-SCNC: 27 MMOL/L — SIGNIFICANT CHANGE UP (ref 22–31)
CREAT SERPL-MCNC: 1 MG/DL — SIGNIFICANT CHANGE UP (ref 0.5–1.3)
EOSINOPHIL # BLD AUTO: 0.21 K/UL — SIGNIFICANT CHANGE UP (ref 0–0.5)
EOSINOPHIL NFR BLD AUTO: 2.6 % — SIGNIFICANT CHANGE UP (ref 0–6)
GLUCOSE SERPL-MCNC: 102 MG/DL — HIGH (ref 70–99)
HCT VFR BLD CALC: 43.7 % — SIGNIFICANT CHANGE UP (ref 39–50)
HGB BLD-MCNC: 14.3 G/DL — SIGNIFICANT CHANGE UP (ref 13–17)
IMM GRANULOCYTES NFR BLD AUTO: 0.4 % — SIGNIFICANT CHANGE UP (ref 0–1.5)
LYMPHOCYTES # BLD AUTO: 2.54 K/UL — SIGNIFICANT CHANGE UP (ref 1–3.3)
LYMPHOCYTES # BLD AUTO: 31.4 % — SIGNIFICANT CHANGE UP (ref 13–44)
MCHC RBC-ENTMCNC: 25.5 PG — LOW (ref 27–34)
MCHC RBC-ENTMCNC: 32.7 GM/DL — SIGNIFICANT CHANGE UP (ref 32–36)
MCV RBC AUTO: 77.9 FL — LOW (ref 80–100)
MONOCYTES # BLD AUTO: 0.49 K/UL — SIGNIFICANT CHANGE UP (ref 0–0.9)
MONOCYTES NFR BLD AUTO: 6.1 % — SIGNIFICANT CHANGE UP (ref 2–14)
NEUTROPHILS # BLD AUTO: 4.79 K/UL — SIGNIFICANT CHANGE UP (ref 1.8–7.4)
NEUTROPHILS NFR BLD AUTO: 59.1 % — SIGNIFICANT CHANGE UP (ref 43–77)
NT-PROBNP SERPL-SCNC: 8 PG/ML — SIGNIFICANT CHANGE UP (ref 0–125)
PLATELET # BLD AUTO: 251 K/UL — SIGNIFICANT CHANGE UP (ref 150–400)
POTASSIUM SERPL-MCNC: 3.8 MMOL/L — SIGNIFICANT CHANGE UP (ref 3.5–5.3)
POTASSIUM SERPL-SCNC: 3.8 MMOL/L — SIGNIFICANT CHANGE UP (ref 3.5–5.3)
PROT SERPL-MCNC: 7.5 GM/DL — SIGNIFICANT CHANGE UP (ref 6–8.3)
RBC # BLD: 5.61 M/UL — SIGNIFICANT CHANGE UP (ref 4.2–5.8)
RBC # FLD: 13.4 % — SIGNIFICANT CHANGE UP (ref 10.3–14.5)
SODIUM SERPL-SCNC: 138 MMOL/L — SIGNIFICANT CHANGE UP (ref 135–145)
TROPONIN I, HIGH SENSITIVITY RESULT: 10.54 NG/L — SIGNIFICANT CHANGE UP
WBC # BLD: 8.09 K/UL — SIGNIFICANT CHANGE UP (ref 3.8–10.5)
WBC # FLD AUTO: 8.09 K/UL — SIGNIFICANT CHANGE UP (ref 3.8–10.5)

## 2021-11-09 PROCEDURE — 93010 ELECTROCARDIOGRAM REPORT: CPT

## 2021-11-09 PROCEDURE — 83880 ASSAY OF NATRIURETIC PEPTIDE: CPT

## 2021-11-09 PROCEDURE — 71046 X-RAY EXAM CHEST 2 VIEWS: CPT | Mod: 26

## 2021-11-09 PROCEDURE — 84484 ASSAY OF TROPONIN QUANT: CPT

## 2021-11-09 PROCEDURE — 99283 EMERGENCY DEPT VISIT LOW MDM: CPT | Mod: 25

## 2021-11-09 PROCEDURE — 71046 X-RAY EXAM CHEST 2 VIEWS: CPT

## 2021-11-09 PROCEDURE — 93005 ELECTROCARDIOGRAM TRACING: CPT

## 2021-11-09 PROCEDURE — 80053 COMPREHEN METABOLIC PANEL: CPT

## 2021-11-09 PROCEDURE — 99285 EMERGENCY DEPT VISIT HI MDM: CPT

## 2021-11-09 PROCEDURE — 36415 COLL VENOUS BLD VENIPUNCTURE: CPT

## 2021-11-09 PROCEDURE — 85025 COMPLETE CBC W/AUTO DIFF WBC: CPT

## 2021-11-09 RX ORDER — ALBUTEROL 90 UG/1
2 AEROSOL, METERED ORAL ONCE
Refills: 0 | Status: DISCONTINUED | OUTPATIENT
Start: 2021-11-09 | End: 2021-11-09

## 2021-11-09 NOTE — ED STATDOCS - OBJECTIVE STATEMENT
49 y/o M with no significant PMHx presents to the ED from  c/o difficulty breathing x 10 days. Pt was told at  that he had an abnormal EKG due to PVCs; pt states that he has had PVCs prior. Denies palpitations. No LE swelling. States he was taking a walk recently and noticed some difficulty as he was talking on the phone while walking. Had a stress test within the past year at a cardiologist at [place]. Had COVID in December 2020. Non smoker. 10/22/21 traveled to [place] and returned on 10/27/21 but states that symptoms preceded trip. 49 y/o M with no significant PMHx presents to the ED from  c/o difficulty breathing x 10 days. Pt was told at  that he had an abnormal EKG due to PVCs; pt states that he has had PVCs prior and this is not new. Denies palpitations. No LE swelling. States he was taking a walk recently and noticed some difficulty breathing as he was talking on the phone while walking. Had a stress test within the past year at a cardiologist at [place]. Had COVID in December 2020. Non smoker. 10/22/21 traveled to [place] and returned on 10/27/21 but states that symptoms preceded trip.

## 2021-11-09 NOTE — ED STATDOCS - PATIENT PORTAL LINK FT
You can access the FollowMyHealth Patient Portal offered by [facility] by registering at the following website: [email or web address]. By joining [employer]’s FollowMyHealth portal, you will also be able to view your health information using other applications (apps) compatible with our system.

## 2021-11-09 NOTE — ED STATDOCS - CLINICAL SUMMARY MEDICAL DECISION MAKING FREE TEXT BOX
51 y/o M with sensation of air hunger for the past 10 days. Did travel but symptoms preceded trip. No cough. Will obtain labs, CXR, EKG. 49 y/o M with sensation of air hunger for the past 10 days. Did travel, but symptoms preceded trip. No cough. Will obtain labs, CXR, EKG.

## 2021-11-09 NOTE — ED STATDOCS - NS_ ATTENDINGSCRIBEDETAILS _ED_A_ED_FT
Mckayla Brown MD: The history, relevant review of systems, past medical and surgical history, medical decision making, and physical examination was documented by the scribe in my presence and I attest to the accuracy of the documentation.

## 2021-11-09 NOTE — ED ADULT TRIAGE NOTE - CHIEF COMPLAINT QUOTE
Pt comes to ED sent from urgent care where he was complaining of increased discomfort with deep inhalation. Pt states that he has felt this way for several weeks. Pt was seen at urgent care and had an EKG which he states is probably normal for him but urgent care states that it is abnormal and sent him for further evaluation to the ED. Pt able to speak in full sentences.

## 2021-11-09 NOTE — ED ADULT NURSE NOTE - HIV OFFER
CSF specimens collected using two patient identifiers.  Verified by the patient, Alpa Anderson, and myself.    Mary Parada, RTR) [phone]  
Opt out

## 2021-12-22 ENCOUNTER — TRANSCRIPTION ENCOUNTER (OUTPATIENT)
Age: 51
End: 2021-12-22

## 2024-06-06 NOTE — DISCHARGE NOTE NURSING/CASE MANAGEMENT/SOCIAL WORK - NSDCFUADDAPPT_GEN_ALL_CORE_FT
Please follow up with Dr Alegria of surgical oncology for evaluation of adrenal lesion incidentaly noted on CT scan
PRINCIPAL DISCHARGE DIAGNOSIS  Diagnosis: S/P cardiac cath  Assessment and Plan of Treatment: You were in the hospital for a cardiac catheterization. Continue aspirin and Plavix, do not stop unless instructed by your physician.  Continue low salt, fat, cholesterol and carbohydrate diet. Follow up with your cardiologist in 1-2 weeks.

## 2024-07-16 NOTE — ED ADULT TRIAGE NOTE - AS HEIGHT TYPE
stated Detail Level: Detailed Quality 226: Preventive Care And Screening: Tobacco Use: Screening And Cessation Intervention: Patient screened for tobacco use and is an ex/non-smoker

## 2025-05-23 ENCOUNTER — OUTPATIENT (OUTPATIENT)
Dept: OUTPATIENT SERVICES | Facility: HOSPITAL | Age: 55
LOS: 1 days | End: 2025-05-23
Payer: COMMERCIAL

## 2025-05-23 VITALS
SYSTOLIC BLOOD PRESSURE: 134 MMHG | DIASTOLIC BLOOD PRESSURE: 85 MMHG | WEIGHT: 216.93 LBS | OXYGEN SATURATION: 98 % | TEMPERATURE: 98 F | RESPIRATION RATE: 18 BRPM | HEART RATE: 62 BPM | HEIGHT: 65 IN

## 2025-05-23 DIAGNOSIS — Z90.49 ACQUIRED ABSENCE OF OTHER SPECIFIED PARTS OF DIGESTIVE TRACT: Chronic | ICD-10-CM

## 2025-05-23 DIAGNOSIS — M65.30 TRIGGER FINGER, UNSPECIFIED FINGER: ICD-10-CM

## 2025-05-23 DIAGNOSIS — M65.332 TRIGGER FINGER, LEFT MIDDLE FINGER: ICD-10-CM

## 2025-05-23 DIAGNOSIS — Z01.818 ENCOUNTER FOR OTHER PREPROCEDURAL EXAMINATION: ICD-10-CM

## 2025-05-23 DIAGNOSIS — Z98.890 OTHER SPECIFIED POSTPROCEDURAL STATES: Chronic | ICD-10-CM

## 2025-05-23 PROBLEM — Z78.9 OTHER SPECIFIED HEALTH STATUS: Chronic | Status: INACTIVE | Noted: 2020-06-11 | Resolved: 2025-05-23

## 2025-05-23 PROCEDURE — G0463: CPT

## 2025-05-23 NOTE — H&P PST ADULT - PROBLEM SELECTOR PLAN 1
55 y/o male with  left long finger trigger  scheduled surgery: left long finger trigger release  will obtain medical clearance  pre-op instructions provided  Instructions provided on medications to continue and to take the day morning of surgery  patient reports  blood works and EKG done at [facility], will obtain results

## 2025-05-23 NOTE — H&P PST ADULT - HISTORY OF PRESENT ILLNESS
55 y/o male present with left long finger trigger . Patient reports he has pain and discomfort for almost a year. He complains at times the finger locked in a bend position. Finger stiffness. Patient is scheduled for Left long finger trigger release

## 2025-05-23 NOTE — H&P PST ADULT - NSANTHOSAYNRD_GEN_A_CORE
No. NEVILLE screening performed.  STOP BANG Legend: 0-2 = LOW Risk; 3-4 = INTERMEDIATE Risk; 5-8 = HIGH Risk

## 2025-05-23 NOTE — H&P PST ADULT - PROBLEM SELECTOR PROBLEM 1
1) Briefly describe your symptoms: lower back pain, neck  pain     2) How did your symptoms start: Neck marge    3) Average pain intensity:   Last 24 hrs: No Pain 1 2 3 4 5 6 7 8 9 10   Past week: No Pain 1 2 3 4 5 6 7 8 9 10    4) How often do you experience you symptoms?   A) Constantly (% of the time)   B) Frequently (51-75% of the time)   C) Occasionally (26-50% of the time)   D) Intermittantly (0-25% of the time)    5) How much have your symptoms interfered with your usual daily activities?   A) Not at all   B) A little bit   C) Moderately   D) Quite a bit   E) Extremely    6) How is your condition changing since care began at this facility?   A) N/A - this is an initial visit   B) Much worse   C) Worse   D) A little worse   E) No change   F) A little better   G) Better   H) Much Better    7) In general, would you say your overall health right now is...   A) Excellent   B) Very good   C) Good   D) Fair   E) Poor     Left trigger finger

## 2025-05-23 NOTE — H&P PST ADULT - NSICDXPROCEDURE_GEN_ALL_CORE_FT
PROCEDURES:  Trigger finger release 23-May-2025 07:17:21 left long finger trigger release Latisha Mills

## 2025-06-04 ENCOUNTER — TRANSCRIPTION ENCOUNTER (OUTPATIENT)
Age: 55
End: 2025-06-04

## 2025-06-04 ENCOUNTER — OUTPATIENT (OUTPATIENT)
Dept: OUTPATIENT SERVICES | Facility: HOSPITAL | Age: 55
LOS: 1 days | End: 2025-06-04
Payer: COMMERCIAL

## 2025-06-04 VITALS
HEART RATE: 67 BPM | WEIGHT: 215.17 LBS | SYSTOLIC BLOOD PRESSURE: 143 MMHG | TEMPERATURE: 97 F | HEIGHT: 65 IN | RESPIRATION RATE: 10 BRPM | DIASTOLIC BLOOD PRESSURE: 92 MMHG | OXYGEN SATURATION: 96 %

## 2025-06-04 VITALS — DIASTOLIC BLOOD PRESSURE: 89 MMHG | HEART RATE: 63 BPM | SYSTOLIC BLOOD PRESSURE: 131 MMHG

## 2025-06-04 DIAGNOSIS — Z90.49 ACQUIRED ABSENCE OF OTHER SPECIFIED PARTS OF DIGESTIVE TRACT: Chronic | ICD-10-CM

## 2025-06-04 DIAGNOSIS — Z98.890 OTHER SPECIFIED POSTPROCEDURAL STATES: Chronic | ICD-10-CM

## 2025-06-04 DIAGNOSIS — M65.332 TRIGGER FINGER, LEFT MIDDLE FINGER: ICD-10-CM

## 2025-06-04 PROCEDURE — 26055 INCISE FINGER TENDON SHEATH: CPT | Mod: XU,F2

## 2025-06-04 PROCEDURE — 26160 REMOVE TENDON SHEATH LESION: CPT | Mod: F2

## 2025-06-04 PROCEDURE — 26145 TENDON EXCISION PALM/FINGER: CPT | Mod: XU,F2

## 2025-06-04 RX ORDER — SODIUM CHLORIDE 9 G/1000ML
1000 INJECTION, SOLUTION INTRAVENOUS
Refills: 0 | Status: DISCONTINUED | OUTPATIENT
Start: 2025-06-04 | End: 2025-06-04

## 2025-06-04 RX ORDER — TRAZODONE HCL 100 MG
0 TABLET ORAL
Refills: 0 | DISCHARGE

## 2025-06-04 RX ORDER — HYDROMORPHONE/SOD CHLOR,ISO/PF 2 MG/10 ML
0.5 SYRINGE (ML) INJECTION
Refills: 0 | Status: DISCONTINUED | OUTPATIENT
Start: 2025-06-04 | End: 2025-06-04

## 2025-06-04 RX ORDER — ROSUVASTATIN CALCIUM 20 MG/1
1 TABLET, FILM COATED ORAL
Refills: 0 | DISCHARGE

## 2025-06-04 RX ORDER — CEFAZOLIN SODIUM IN 0.9 % NACL 3 G/100 ML
2000 INTRAVENOUS SOLUTION, PIGGYBACK (ML) INTRAVENOUS ONCE
Refills: 0 | Status: COMPLETED | OUTPATIENT
Start: 2025-06-04 | End: 2025-06-04

## 2025-06-04 RX ORDER — HYDROMORPHONE/SOD CHLOR,ISO/PF 2 MG/10 ML
0.2 SYRINGE (ML) INJECTION
Refills: 0 | Status: DISCONTINUED | OUTPATIENT
Start: 2025-06-04 | End: 2025-06-04

## 2025-06-04 RX ORDER — ONDANSETRON HCL/PF 4 MG/2 ML
4 VIAL (ML) INJECTION ONCE
Refills: 0 | Status: DISCONTINUED | OUTPATIENT
Start: 2025-06-04 | End: 2025-06-04

## 2025-06-04 RX ADMIN — SODIUM CHLORIDE 75 MILLILITER(S): 9 INJECTION, SOLUTION INTRAVENOUS at 08:59

## 2025-06-04 RX ADMIN — Medication 1 APPLICATION(S): at 07:03

## 2025-06-04 NOTE — BRIEF OPERATIVE NOTE - NSICDXBRIEFPOSTOP_GEN_ALL_CORE_FT
POST-OP DIAGNOSIS:  Trigger finger, left middle finger 04-Jun-2025 08:36:36  Anamika Murray  Ganglion cyst of flexor tendon sheath of finger of left hand 04-Jun-2025 08:36:51  Anamika Murray

## 2025-06-04 NOTE — BRIEF OPERATIVE NOTE - NSICDXBRIEFPROCEDURE_GEN_ALL_CORE_FT
PROCEDURES:  Trigger finger release 04-Jun-2025 08:36:00  Anamika Murray  Excision, ganglion cyst, tendon pulley, hand 04-Jun-2025 08:36:10  Anamika Murray  Block, nerve, median 04-Jun-2025 08:38:02  Anamika Murray

## 2025-06-04 NOTE — ASU DISCHARGE PLAN (ADULT/PEDIATRIC) - FINANCIAL ASSISTANCE
[facility] provides services at a reduced cost to those who are determined to be eligible through [facility]’s financial assistance program. Information regarding [facility]’s financial assistance program can be found by going to [email or web address] or by calling [phone].

## 2025-07-21 ENCOUNTER — NON-APPOINTMENT (OUTPATIENT)
Age: 55
End: 2025-07-21

## 2025-07-26 ENCOUNTER — NON-APPOINTMENT (OUTPATIENT)
Age: 55
End: 2025-07-26

## 2025-09-20 ENCOUNTER — NON-APPOINTMENT (OUTPATIENT)
Age: 55
End: 2025-09-20

## (undated) DEVICE — DRAPE TOWEL BLUE 17" X 24"

## (undated) DEVICE — SOL IRR POUR NS 0.9% 1000ML

## (undated) DEVICE — IMMOBILIZER HAND ALUMINUM LARGE

## (undated) DEVICE — SUT MONOSOF 4-0 18" P-12

## (undated) DEVICE — SUT MONOSOF 3-0 18" P-12

## (undated) DEVICE — PACK UPPER EXTREMITY

## (undated) DEVICE — TOURNIQUET ESMARK 4"

## (undated) DEVICE — TOURNIQUET CUFF 18" DUAL PORT DUAL BLADDER W PLC (BLACK)

## (undated) DEVICE — SOL IRR POUR H2O 1000ML

## (undated) DEVICE — DRAPE 3/4 SHEET 52X76"

## (undated) DEVICE — GLV 7 PROTEXIS (WHITE)

## (undated) DEVICE — IMMOBILIZER HAND ALUMINUM XL

## (undated) DEVICE — WARMING BLANKET LOWER ADULT

## (undated) DEVICE — GLV 7.5 PROTEXIS (BLUE)

## (undated) DEVICE — SLING ARM CHIEFTAIN MESH LARGE

## (undated) DEVICE — VENODYNE/SCD SLEEVE CALF MEDIUM